# Patient Record
Sex: FEMALE | Race: WHITE | URBAN - METROPOLITAN AREA
[De-identification: names, ages, dates, MRNs, and addresses within clinical notes are randomized per-mention and may not be internally consistent; named-entity substitution may affect disease eponyms.]

---

## 2024-03-29 ENCOUNTER — HOSPITAL ENCOUNTER (OUTPATIENT)
Dept: RADIOLOGY | Facility: CLINIC | Age: 49
Discharge: HOME | End: 2024-03-29
Payer: COMMERCIAL

## 2024-03-29 VITALS — WEIGHT: 165 LBS | HEIGHT: 66 IN | BODY MASS INDEX: 26.52 KG/M2

## 2024-03-29 DIAGNOSIS — Z12.31 SCREENING MAMMOGRAM FOR BREAST CANCER: ICD-10-CM

## 2024-03-29 PROCEDURE — 77067 SCR MAMMO BI INCL CAD: CPT | Performed by: RADIOLOGY

## 2024-03-29 PROCEDURE — 77067 SCR MAMMO BI INCL CAD: CPT

## 2024-03-29 PROCEDURE — 77063 BREAST TOMOSYNTHESIS BI: CPT | Performed by: RADIOLOGY

## 2024-04-05 DIAGNOSIS — Z80.3 FAMILY HISTORY OF BREAST CANCER IN FEMALE: ICD-10-CM

## 2024-04-05 DIAGNOSIS — R92.333 HETEROGENEOUSLY DENSE TISSUE OF BOTH BREASTS ON MAMMOGRAPHY: ICD-10-CM

## 2024-04-05 DIAGNOSIS — Z91.89 AT HIGH RISK FOR BREAST CANCER: Primary | ICD-10-CM

## 2024-06-19 ENCOUNTER — OFFICE VISIT (OUTPATIENT)
Dept: OBSTETRICS AND GYNECOLOGY | Facility: CLINIC | Age: 49
End: 2024-06-19
Payer: COMMERCIAL

## 2024-06-19 VITALS
SYSTOLIC BLOOD PRESSURE: 124 MMHG | BODY MASS INDEX: 28.72 KG/M2 | WEIGHT: 183 LBS | HEIGHT: 67 IN | DIASTOLIC BLOOD PRESSURE: 82 MMHG

## 2024-06-19 DIAGNOSIS — Z11.51 ENCOUNTER FOR SCREENING FOR HUMAN PAPILLOMAVIRUS (HPV): ICD-10-CM

## 2024-06-19 DIAGNOSIS — Z01.419 ENCOUNTER FOR ANNUAL ROUTINE GYNECOLOGICAL EXAMINATION: Primary | ICD-10-CM

## 2024-06-19 DIAGNOSIS — Z12.11 COLON CANCER SCREENING: ICD-10-CM

## 2024-06-19 DIAGNOSIS — F32.89 OTHER DEPRESSION: ICD-10-CM

## 2024-06-19 PROCEDURE — 99396 PREV VISIT EST AGE 40-64: CPT | Performed by: OBSTETRICS & GYNECOLOGY

## 2024-06-19 RX ORDER — CITALOPRAM 10 MG/1
10 TABLET ORAL DAILY
COMMUNITY
End: 2024-06-20 | Stop reason: SDUPTHER

## 2024-06-19 ASSESSMENT — LIFESTYLE VARIABLES
HOW MANY STANDARD DRINKS CONTAINING ALCOHOL DO YOU HAVE ON A TYPICAL DAY: 1 OR 2
SKIP TO QUESTIONS 9-10: 1
HOW OFTEN DO YOU HAVE SIX OR MORE DRINKS ON ONE OCCASION: NEVER
AUDIT-C TOTAL SCORE: 2
HOW OFTEN DO YOU HAVE A DRINK CONTAINING ALCOHOL: 2-4 TIMES A MONTH

## 2024-06-19 ASSESSMENT — PATIENT HEALTH QUESTIONNAIRE - PHQ9
1. LITTLE INTEREST OR PLEASURE IN DOING THINGS: NOT AT ALL
SUM OF ALL RESPONSES TO PHQ9 QUESTIONS 1 & 2: 0
2. FEELING DOWN, DEPRESSED OR HOPELESS: NOT AT ALL

## 2024-06-19 ASSESSMENT — PAIN SCALES - GENERAL: PAINLEVEL: 0-NO PAIN

## 2024-06-19 ASSESSMENT — ENCOUNTER SYMPTOMS
VOMITING: 0
DYSURIA: 0
COUGH: 0
FATIGUE: 0
COLOR CHANGE: 0
DEPRESSION: 0
HEADACHES: 0
OCCASIONAL FEELINGS OF UNSTEADINESS: 0
FEVER: 0
DIZZINESS: 0
ABDOMINAL PAIN: 0
NAUSEA: 0
SHORTNESS OF BREATH: 0
CHILLS: 0
UNEXPECTED WEIGHT CHANGE: 0
LOSS OF SENSATION IN FEET: 0

## 2024-06-19 NOTE — PROGRESS NOTES
"Annual  Subjective   Myke Mendes is a 49 y.o. female who is here for a routine exam.     Complaints:   none  Periods: irregular   Dysmenorrhea:  none    Current contraception: condoms  History of abnormal Pap smear: no  History of abnormal mammogram: no      OB History          3    Para   2    Term   2       0    AB   1    Living   2         SAB   1    IAB   0    Ectopic   0    Multiple   0    Live Births   2                  Review of Systems   Constitutional:  Negative for chills, fatigue, fever and unexpected weight change.   Respiratory:  Negative for cough and shortness of breath.    Gastrointestinal:  Negative for abdominal pain, nausea and vomiting.   Genitourinary:  Positive for menstrual problem. Negative for dyspareunia, dysuria, pelvic pain and vaginal discharge.   Skin:  Negative for color change and rash.   Neurological:  Negative for dizziness and headaches.       Objective   /82   Ht 1.702 m (5' 7\")   Wt 83 kg (183 lb)   LMP 2024 (Exact Date)   BMI 28.66 kg/m²        General:   Alert and oriented, in no acute distress   Neck: Supple. No visible thyromegaly.    Breast/Axilla: Normal to palpation bilaterally without masses, skin changes, or nipple discharge.    Abdomen: Soft, non-tender, without masses or organomegaly   Vulva: Normal architecture without erythema, masses, or lesions.    Vagina: Normal mucosa without lesions, masses, or atrophy. No abnormal vaginal discharge.    Cervix: Normal without masses, lesions, or signs of cervicitis   Uterus: Normal, mobile, non-enlarged uterus   Adnexa: Normal without masses or lesions   Pelvic Floor normal   Psych Normal affect. Normal mood.      Assessment/Plan   Problem List Items Addressed This Visit    None  Visit Diagnoses         Codes    Encounter for annual routine gynecological examination    -  Primary Z01.419    Relevant Orders    THINPREP PAP TEST    Encounter for screening for human papillomavirus (HPV)     " Z11.51    Relevant Orders    THINPREP PAP TEST    Colon cancer screening     Z12.11    Relevant Orders    Referral to Gastroenterology    Other depression     F32.89    Relevant Medications    citalopram (CeleXA) 10 mg tablet            Routine annual    Pap due  today  Mammogram done, has MRI ordered    Wei Gaines MD

## 2024-06-20 RX ORDER — CITALOPRAM 10 MG/1
10 TABLET ORAL DAILY
Qty: 90 TABLET | Refills: 3 | Status: SHIPPED | OUTPATIENT
Start: 2024-06-20 | End: 2024-06-20 | Stop reason: WASHOUT

## 2024-06-28 LAB
CYTOLOGY CMNT CVX/VAG CYTO-IMP: NORMAL
HPV HR 12 DNA GENITAL QL NAA+PROBE: NEGATIVE
HPV HR GENOTYPES PNL CVX NAA+PROBE: NEGATIVE
HPV16 DNA SPEC QL NAA+PROBE: NEGATIVE
HPV18 DNA SPEC QL NAA+PROBE: NEGATIVE
LAB AP HPV GENOTYPE QUESTION: YES
LAB AP HPV HR: NORMAL
LABORATORY COMMENT REPORT: NORMAL
LABORATORY COMMENT REPORT: NORMAL
LMP START DATE: NORMAL
PATH REPORT.TOTAL CANCER: NORMAL

## 2024-10-11 ENCOUNTER — HOSPITAL ENCOUNTER (OUTPATIENT)
Dept: RADIOLOGY | Facility: HOSPITAL | Age: 49
Discharge: HOME | End: 2024-10-11
Payer: COMMERCIAL

## 2024-10-11 DIAGNOSIS — R92.333 HETEROGENEOUSLY DENSE TISSUE OF BOTH BREASTS ON MAMMOGRAPHY: ICD-10-CM

## 2024-10-11 DIAGNOSIS — Z91.89 AT HIGH RISK FOR BREAST CANCER: ICD-10-CM

## 2024-10-11 DIAGNOSIS — Z80.3 FAMILY HISTORY OF BREAST CANCER IN FEMALE: ICD-10-CM

## 2024-10-11 PROCEDURE — A9575 INJ GADOTERATE MEGLUMI 0.1ML: HCPCS

## 2024-10-11 PROCEDURE — 2550000001 HC RX 255 CONTRASTS

## 2024-10-11 PROCEDURE — 77049 MRI BREAST C-+ W/CAD BI: CPT

## 2024-10-11 RX ORDER — GADOTERATE MEGLUMINE 376.9 MG/ML
20 INJECTION INTRAVENOUS
Status: COMPLETED | OUTPATIENT
Start: 2024-10-11 | End: 2024-10-11

## 2024-10-24 DIAGNOSIS — R93.89 ABNORMAL MRI: Primary | ICD-10-CM

## 2024-12-09 ENCOUNTER — APPOINTMENT (OUTPATIENT)
Dept: GASTROENTEROLOGY | Facility: CLINIC | Age: 49
End: 2024-12-09
Payer: COMMERCIAL

## 2024-12-11 NOTE — PROGRESS NOTES
History Of Present Illness  Myke Mendes is a 49 y.o. female presenting to GI clinic to discuss colonoscopy. Patient has never had a previous colonoscopy, and she denies GI symptoms. No FH of any colorectal cancers or esophageal cancers.      She has a personal history of celiac disease and adheres to a gluten-free diet.  When she does accidentally consume gluten she gets bloated, has abdominal pain, diarrhea, and she becomes jackson.    Social History  She reports that she has never smoked. She has never used smokeless tobacco. She reports current alcohol use of about 3.0 - 4.0 standard drinks of alcohol per week. She reports that she does not use drugs.  She does not take NSAIDs on a regular basis  She is an eighth     Family History  Family History   Problem Relation Name Age of Onset    No Known Problems Mother      No Known Problems Father      Breast cancer Sister Allison Luna 47    Breast cancer Father's Sister Raquel dave 55    Breast cancer Paternal Grandmother Lety Cjea 75    Breast cancer Other Paternal great aunt 70     The patient does not have a FH of CRC. she does not have a FH of IBD    Review of Systems   Constitutional:  Negative for unexpected weight change.   Gastrointestinal: Negative.  Negative for abdominal distention, abdominal pain, anal bleeding, blood in stool, constipation, diarrhea, nausea, rectal pain and vomiting.        See HPI   All other systems reviewed and are negative.        Physical Exam  Constitutional:       General: She is not in acute distress.     Appearance: Normal appearance. She is overweight. She is not ill-appearing.   HENT:      Head: Normocephalic and atraumatic.      Mouth/Throat:      Mouth: Mucous membranes are moist.   Eyes:      General: No scleral icterus.     Conjunctiva/sclera: Conjunctivae normal.      Pupils: Pupils are equal, round, and reactive to light.   Pulmonary:      Effort: Pulmonary effort is normal.   Abdominal:       "General: Bowel sounds are normal. There is no distension.      Palpations: Abdomen is soft. There is no mass.      Tenderness: There is no abdominal tenderness.      Hernia: No hernia is present.   Musculoskeletal:         General: Normal range of motion.      Cervical back: Normal range of motion.   Skin:     General: Skin is warm and dry.      Coloration: Skin is not jaundiced or pale.   Neurological:      Mental Status: She is alert. Mental status is at baseline.   Psychiatric:         Mood and Affect: Mood normal.         Behavior: Behavior normal.          Last Vital Signs  /82   Pulse 66   Temp 36.5 °C (97.7 °F)   Ht 1.676 m (5' 6\")   Wt 84.8 kg (187 lb)   SpO2 100%   BMI 30.18 kg/m²      Relevant Results  No results found for: \"WBC\", \"HGB\", \"HCT\", \"MCV\", \"PLT\" No results found for: \"GLUCOSE\", \"CALCIUM\", \"NA\", \"K\", \"CO2\", \"CL\", \"BUN\", \"CREATININE\" No results found for: \"ALT\", \"AST\", \"GGT\", \"ALKPHOS\", \"BILITOT\", \"BILIDIR\", \"BILIIND\", \"AFPTM\", \"INR\" No results found for: \"IRON\", \"TIBC\", \"IRONSAT\", \"FERRITIN\", \"AXFQTQLO48\", \"FOLATE\"  No results found for: \"CALPS\", \"CRP\" No results found for: \"LIPASE\" No results found for: \"HGBA1C\"   Component 03/20/17 04/24/14   IgA 193 --   Transglutaminase Ab, IgA 38 High   29 High     Transglutaminase Ab, IgG Test Not Indicated --   Endomysial Ab, IgA Reference range: DLT10  --   Gliadin Deamidated IgA Ab 9  --   Gliadin Deamidated IgG Ab 33 High   --   Interpretation (Celiac Screen) The presence of TTG IgA antibodies, coupled with additional findings in a   confirmatory assay, indicates an increased likelihood of celiac disease. --     EGD/COLONOSCOPY/COLOGUARD  EGD 3/3/2017 with Dr. Munoz Banner-   Findings:  ESOPHAGUS: The esophageal mucosa appeared normal without evidence of Collins's or reflux esophagitis  STOMACH: There was mild antral gastritis, with linear erythema extending to the pylorus. Multiple biopsies were obtained from the antrum and the body of the " stomach to evaluate for H. pylori infection.   PYLORUS: The pylorus appeared normal.   DUODENUM: The mucosa in the duodenal bulb was edematous and erythematous. Cold biopsies were obtained to rule out celiac disease. The mucosa within the second portion of the duodenum appeared normal.   FINAL DIAGNOSIS  A. Duodenum, Biopsy:  -  MALABSORPTION PATTERN WITH PARTIAL VILLOUS ATROPHY.  Comment:  There is partial villous atrophy, crypt hyperplasia, and chronic  inflammation.  These changes are nonspecific, but may reflect conditions such as  early or partially treated celiac sprue, bacterial overgrowth, hypersensitivity  reaction to nongluten proteins, tropical sprue, or protein calorie malnutrition.    B. Stomach, Biopsy:  - FOCAL CHRONIC INACTIVE GASTRITIS.  Comment: Giemsa stain is negative for Helicobacter pylori. No intestinal  metaplasia is seen.  There is no evidence of dysplasia.      Colonoscopy-   never       Assessment/Plan    Assessment & Plan  Colon cancer screening  Screening colonoscopy ordered  Orders:    Referral to Gastroenterology    Colonoscopy Screening; Average Risk Patient; Future    Celiac disease ([facility])  Celiac panel, CRP ordered  May need additional labs or EGD pending results  Orders:    Celiac Panel; Future    C-Reactive Protein; Future    Follow-up as needed      ART Nolan-CNP  12/16/24

## 2024-12-16 ENCOUNTER — APPOINTMENT (OUTPATIENT)
Dept: GASTROENTEROLOGY | Facility: CLINIC | Age: 49
End: 2024-12-16
Payer: COMMERCIAL

## 2024-12-16 ENCOUNTER — LAB (OUTPATIENT)
Dept: LAB | Facility: LAB | Age: 49
End: 2024-12-16
Payer: COMMERCIAL

## 2024-12-16 VITALS
HEART RATE: 66 BPM | OXYGEN SATURATION: 100 % | TEMPERATURE: 97.7 F | WEIGHT: 187 LBS | BODY MASS INDEX: 30.05 KG/M2 | HEIGHT: 66 IN | SYSTOLIC BLOOD PRESSURE: 160 MMHG | DIASTOLIC BLOOD PRESSURE: 82 MMHG

## 2024-12-16 DIAGNOSIS — Z12.11 COLON CANCER SCREENING: ICD-10-CM

## 2024-12-16 DIAGNOSIS — K90.0 CELIAC DISEASE (HHS-HCC): Primary | ICD-10-CM

## 2024-12-16 DIAGNOSIS — K90.0 CELIAC DISEASE (HHS-HCC): ICD-10-CM

## 2024-12-16 PROBLEM — F41.9 ANXIETY: Status: ACTIVE | Noted: 2024-12-16

## 2024-12-16 PROBLEM — N95.1 PERIMENOPAUSE: Status: ACTIVE | Noted: 2024-12-16

## 2024-12-16 LAB
CRP SERPL-MCNC: <0.1 MG/DL
GLIADIN PEPTIDE IGA SER IA-ACNC: 1.1 U/ML
TTG IGA SER IA-ACNC: 1.3 U/ML

## 2024-12-16 PROCEDURE — 83516 IMMUNOASSAY NONANTIBODY: CPT

## 2024-12-16 PROCEDURE — 3008F BODY MASS INDEX DOCD: CPT

## 2024-12-16 PROCEDURE — 36415 COLL VENOUS BLD VENIPUNCTURE: CPT

## 2024-12-16 PROCEDURE — 1036F TOBACCO NON-USER: CPT

## 2024-12-16 PROCEDURE — 99203 OFFICE O/P NEW LOW 30 MIN: CPT

## 2024-12-16 ASSESSMENT — ENCOUNTER SYMPTOMS
RECTAL PAIN: 0
DIARRHEA: 0
ANAL BLEEDING: 0
UNEXPECTED WEIGHT CHANGE: 0
ROS GI COMMENTS: SEE HPI
BLOOD IN STOOL: 0
ABDOMINAL PAIN: 0
VOMITING: 0
CONSTIPATION: 0
GASTROINTESTINAL NEGATIVE: 1
NAUSEA: 0
ABDOMINAL DISTENTION: 0

## 2024-12-18 LAB
GLIADIN PEPTIDE IGG SER IA-ACNC: <0.56 FLU (ref 0–4.99)
TTG IGG SER IA-ACNC: <0.82 FLU (ref 0–4.99)

## 2024-12-18 NOTE — RESULT ENCOUNTER NOTE
Please call Myke and let her know that lab results were unremarkable and her celiac disease is well-controlled.

## 2025-01-06 ENCOUNTER — ANESTHESIA EVENT (OUTPATIENT)
Dept: GASTROENTEROLOGY | Facility: HOSPITAL | Age: 50
End: 2025-01-06
Payer: COMMERCIAL

## 2025-01-06 PROBLEM — K90.0 CELIAC DISEASE (HHS-HCC): Status: ACTIVE | Noted: 2025-01-06

## 2025-01-06 SDOH — HEALTH STABILITY: MENTAL HEALTH: CURRENT SMOKER: 0

## 2025-01-06 NOTE — ANESTHESIA PREPROCEDURE EVALUATION
Patient: Myke Mendes    Procedure Information       Date/Time: 01/23/25 0800    Scheduled providers: Kristin Anaya MD    Procedure: COLONOSCOPY    Location: [facility]            Relevant Problems   Anesthesia (within normal limits)      Cardiac (within normal limits)      Pulmonary (within normal limits)      Neuro   (+) Anxiety      GI (within normal limits)      /Renal (within normal limits)      Liver (within normal limits)      Endocrine (within normal limits)      Hematology (within normal limits)      Musculoskeletal (within normal limits)      HEENT (within normal limits)      ID (within normal limits)      Skin (within normal limits)      GYN (within normal limits)     There were no vitals filed for this visit.    Past Surgical History:   Procedure Laterality Date    BI STEREOTACTIC GUIDED BREAST LEFT LOCALIZATION AND BIOPSY Left 08/05/2015    BI STEREOTACTIC GUIDED BREAST LOCALIZATION AND BIOPSY LEFT [facility]    BREAST BIOPSY       Past Medical History:   Diagnosis Date    Depression     Rh incompatibility        Current Outpatient Medications:     cetirizine (ZyrTEC) 10 mg capsule, Take 1 capsule (10 mg) by mouth 1 time., Disp: , Rfl:     citalopram (CeleXA) 20 mg tablet, Take 1 tablet (20 mg) by mouth once daily., Disp: 90 tablet, Rfl: 3  Prior to Admission medications    Medication Sig Start Date End Date Taking? Authorizing Provider   cetirizine (ZyrTEC) 10 mg capsule Take 1 capsule (10 mg) by mouth 1 time.    Historical Provider, MD   citalopram (CeleXA) 20 mg tablet Take 1 tablet (20 mg) by mouth once daily. 6/20/24   Wei Gaines MD     Allergies   Allergen Reactions    Penicillins Hives, Shortness of breath, Swelling and Unknown     Throat swells     Social History     Tobacco Use    Smoking status: Never    Smokeless tobacco: Never   Substance Use Topics    Alcohol use: Yes     Alcohol/week: 3.0 - 4.0 standard drinks of alcohol     Types: 3 - 4 Glasses of wine  "per week         Chemistry    No results found for: \"NA\", \"K\", \"CL\", \"CO2\", \"BUN\", \"CREATININE\", \"GLU\" No results found for: \"CALCIUM\", \"ALKPHOS\", \"AST\", \"ALT\", \"BILITOT\"       No results found for: \"WBC\", \"HGB\", \"HCT\", \"PLT\"  No results found for: \"PROTIME\", \"PTT\", \"INR\"  No results found for this or any previous visit (from the past 4464 hours).  No results found for this or any previous visit from the past 1095 days.      Clinical information reviewed:                 Chart reviewed.  No clearances ordered.    NPO Detail:  No data recorded     Physical Exam    Airway  Mallampati: II     Cardiovascular - normal exam     Dental    Pulmonary - normal exam     Abdominal - normal exam             Anesthesia Plan    History of general anesthesia?: yes  History of complications of general anesthesia?: no    ASA 2     MAC     The patient is not a current smoker.  Patient did not smoke on day of procedure.    intravenous induction   Anesthetic plan and risks discussed with patient.      "

## 2025-01-08 ENCOUNTER — PRE-ADMISSION TESTING (OUTPATIENT)
Dept: PREADMISSION TESTING | Facility: HOSPITAL | Age: 50
End: 2025-01-08
Payer: COMMERCIAL

## 2025-01-08 VITALS — WEIGHT: 187 LBS | HEIGHT: 66 IN | BODY MASS INDEX: 30.05 KG/M2

## 2025-01-08 ASSESSMENT — DUKE ACTIVITY SCORE INDEX (DASI)
CAN YOU WALK A BLOCK OR TWO ON LEVEL GROUND: YES
CAN YOU RUN A SHORT DISTANCE: YES
CAN YOU DO MODERATE WORK AROUND THE HOUSE LIKE VACUUMING, SWEEPING FLOORS OR CARRYING GROCERIES: YES
CAN YOU DO YARD WORK LIKE RAKING LEAVES, WEEDING OR PUSHING A MOWER: YES
TOTAL_SCORE: 58.2
CAN YOU DO LIGHT WORK AROUND THE HOUSE LIKE DUSTING OR WASHING DISHES: YES
DASI METS SCORE: 9.9
CAN YOU TAKE CARE OF YOURSELF (EAT, DRESS, BATHE, OR USE TOILET): YES
CAN YOU CLIMB A FLIGHT OF STAIRS OR WALK UP A HILL: YES
CAN YOU WALK INDOORS, SUCH AS AROUND YOUR HOUSE: YES
CAN YOU PARTICIPATE IN MODERATE RECREATIONAL ACTIVITIES LIKE GOLF, BOWLING, DANCING, DOUBLES TENNIS OR THROWING A BASEBALL OR FOOTBALL: YES
CAN YOU HAVE SEXUAL RELATIONS: YES
CAN YOU DO HEAVY WORK AROUND THE HOUSE LIKE SCRUBBING FLOORS OR LIFTING AND MOVING HEAVY FURNITURE: YES
CAN YOU PARTICIPATE IN STRENOUS SPORTS LIKE SWIMMING, SINGLES TENNIS, FOOTBALL, BASKETBALL, OR SKIING: YES

## 2025-01-08 NOTE — PREPROCEDURE INSTRUCTIONS
Medication List            Accurate as of January 8, 2025 12:12 PM. Always use your most recent med list.                citalopram 20 mg tablet  Commonly known as: CeleXA  Take 1 tablet (20 mg) by mouth once daily.  Medication Adjustments for Surgery: Take/Use as prescribed     ZyrTEC 10 mg capsule  Generic drug: cetirizine  Medication Adjustments for Surgery: Take/Use as prescribed                   Follow prep instructions carefully.   Day before procedure-Drink Only CLEAR LIQUIDS -No dairy products, nothing red/purple.  Take first part of prep- Evening Before Procedure, (5pm)  Drink lots of liquids afterwards.   Day of Procedure- Take Second Part of Prep 5 hours before arrival time.    After Midnight the only Clear Liquid allowed is: Water, Black Coffee, Tea, Gatorade and Clear Soda. STOP DRINKING 3 HOURS PRIOR TO PROCEDURE.   No gum, candy, mints or smoking.   Take medications as instructed.       Second floor-Outpt dept to check in.  Bring Photo ID and Insurance card,   You MUST have a  with you.  No more than 2 visitors with you please.   [phone]    Colonoscopy Preparation Instructions      Prior to your procedure, purchase the following from your local grocery store or pharmacy. A prescription is not needed.    1) Miralax (Glycolax) 8.3 oz. aka Polyethylene Glycol Powder    (238 gram bottle)    2) Bisacodyl (Dulcolax laxative) 5 mg    Tablets, NOT suppositories    3) Quantity 2 - 32 ounce bottles of Gatorade, Powerade or any non-carbonated clear liquid. (Non-carbonated for miralax mixture only. Pt may have carbonated drinks before or after drinking miralax prep solution).        Medications that MUST BE STOPPED prior to procedure:  Must be stopped three (3) days prior to procedure:  Supplements    Fiber Iron    All Vitamins

## 2025-01-23 ENCOUNTER — HOSPITAL ENCOUNTER (OUTPATIENT)
Dept: GASTROENTEROLOGY | Facility: HOSPITAL | Age: 50
Discharge: HOME | End: 2025-01-23
Payer: COMMERCIAL

## 2025-01-23 ENCOUNTER — ANESTHESIA (OUTPATIENT)
Dept: GASTROENTEROLOGY | Facility: HOSPITAL | Age: 50
End: 2025-01-23
Payer: COMMERCIAL

## 2025-01-23 VITALS
BODY MASS INDEX: 30.05 KG/M2 | SYSTOLIC BLOOD PRESSURE: 125 MMHG | HEIGHT: 66 IN | TEMPERATURE: 98.1 F | HEART RATE: 63 BPM | WEIGHT: 187 LBS | DIASTOLIC BLOOD PRESSURE: 63 MMHG | RESPIRATION RATE: 18 BRPM | OXYGEN SATURATION: 100 %

## 2025-01-23 DIAGNOSIS — Z12.11 COLON CANCER SCREENING: ICD-10-CM

## 2025-01-23 LAB — HCG UR QL IA.RAPID: NEGATIVE

## 2025-01-23 PROCEDURE — 81025 URINE PREGNANCY TEST: CPT

## 2025-01-23 PROCEDURE — 45385 COLONOSCOPY W/LESION REMOVAL: CPT | Performed by: SURGERY

## 2025-01-23 PROCEDURE — 2720000007 HC OR 272 NO HCPCS

## 2025-01-23 PROCEDURE — 2500000004 HC RX 250 GENERAL PHARMACY W/ HCPCS (ALT 636 FOR OP/ED): Performed by: NURSE ANESTHETIST, CERTIFIED REGISTERED

## 2025-01-23 PROCEDURE — 7100000009 HC PHASE TWO TIME - INITIAL BASE CHARGE

## 2025-01-23 PROCEDURE — 7100000010 HC PHASE TWO TIME - EACH INCREMENTAL 1 MINUTE

## 2025-01-23 PROCEDURE — 3700000002 HC GENERAL ANESTHESIA TIME - EACH INCREMENTAL 1 MINUTE

## 2025-01-23 PROCEDURE — 3700000001 HC GENERAL ANESTHESIA TIME - INITIAL BASE CHARGE

## 2025-01-23 RX ORDER — LIDOCAINE HYDROCHLORIDE 20 MG/ML
INJECTION, SOLUTION EPIDURAL; INFILTRATION; INTRACAUDAL; PERINEURAL AS NEEDED
Status: DISCONTINUED | OUTPATIENT
Start: 2025-01-23 | End: 2025-01-23

## 2025-01-23 RX ORDER — PROPOFOL 10 MG/ML
INJECTION, EMULSION INTRAVENOUS AS NEEDED
Status: DISCONTINUED | OUTPATIENT
Start: 2025-01-23 | End: 2025-01-23

## 2025-01-23 RX ADMIN — LIDOCAINE HYDROCHLORIDE 40 MG: 20 INJECTION, SOLUTION EPIDURAL; INFILTRATION; INTRACAUDAL; PERINEURAL at 07:59

## 2025-01-23 RX ADMIN — PROPOFOL 50 MG: 10 INJECTION, EMULSION INTRAVENOUS at 08:11

## 2025-01-23 RX ADMIN — PROPOFOL 50 MG: 10 INJECTION, EMULSION INTRAVENOUS at 08:07

## 2025-01-23 RX ADMIN — PROPOFOL 120 MG: 10 INJECTION, EMULSION INTRAVENOUS at 07:59

## 2025-01-23 RX ADMIN — PROPOFOL 50 MG: 10 INJECTION, EMULSION INTRAVENOUS at 08:03

## 2025-01-23 RX ADMIN — PROPOFOL 50 MG: 10 INJECTION, EMULSION INTRAVENOUS at 08:14

## 2025-01-23 ASSESSMENT — ENCOUNTER SYMPTOMS
VOMITING: 0
DIARRHEA: 0
CHILLS: 0
MUSCULOSKELETAL NEGATIVE: 1
ABDOMINAL PAIN: 0
CARDIOVASCULAR NEGATIVE: 1
PSYCHIATRIC NEGATIVE: 1
FEVER: 0
BLOOD IN STOOL: 0
WOUND: 0
SHORTNESS OF BREATH: 0
NAUSEA: 0
NEUROLOGICAL NEGATIVE: 1
CONSTIPATION: 0
FATIGUE: 0
DIAPHORESIS: 0

## 2025-01-23 ASSESSMENT — COLUMBIA-SUICIDE SEVERITY RATING SCALE - C-SSRS
2. HAVE YOU ACTUALLY HAD ANY THOUGHTS OF KILLING YOURSELF?: NO
6. HAVE YOU EVER DONE ANYTHING, STARTED TO DO ANYTHING, OR PREPARED TO DO ANYTHING TO END YOUR LIFE?: NO
1. IN THE PAST MONTH, HAVE YOU WISHED YOU WERE DEAD OR WISHED YOU COULD GO TO SLEEP AND NOT WAKE UP?: NO

## 2025-01-23 ASSESSMENT — PAIN SCALES - GENERAL
PAINLEVEL_OUTOF10: 0 - NO PAIN
PAIN_LEVEL: 0
PAINLEVEL_OUTOF10: 0 - NO PAIN
PAINLEVEL_OUTOF10: 0 - NO PAIN

## 2025-01-23 ASSESSMENT — PAIN - FUNCTIONAL ASSESSMENT
PAIN_FUNCTIONAL_ASSESSMENT: 0-10

## 2025-01-23 NOTE — DISCHARGE INSTRUCTIONS
Patient Instructions after a Colonoscopy      The anesthetics, sedatives or narcotics which were given to you today will be acting in your body for the next 24 hours, so you might feel a little sleepy or groggy.  This feeling should slowly wear off. Carefully read and follow the instructions.     You received sedation today:  - Do not drive or operate any machinery or power tools of any kind.   - No alcoholic beverages today, not even beer or wine.  - Do not make any important decisions or sign any legal documents.  - No over the counter medications that contain alcohol or that may cause drowsiness.  - Do not make any important decisions or sign any legal documents.  - Make sure you have someone with you for first 24 hours.    While it is common to experience mild to moderate abdominal distention, gas, or belching after your procedure, if any of these symptoms occur following discharge from the GI Lab or within one week of having your procedure, call the [facility] to be advised whether a visit to your nearest Urgent Care or Emergency Department is indicated.  Take this paper with you if you go.     - If you develop an allergic reaction to the medications that were given during your procedure such as difficulty breathing, rash, hives, severe nausea, vomiting or lightheadedness.  - If you experience chest pain, shortness of breath, severe abdominal pain, fevers and chills.  -If you develop signs and symptoms of bleeding such as blood in your spit, if your stools turn black, tarry, or bloody  - If you have not urinated within 8 hours following your procedure.  - If your IV site becomes painful, red, inflamed, or looks infected.    If you received a biopsy/polypectomy/sphincterotomy the following instructions apply below:    __ Do not use Aspirin containing products, non-steroidal medications or anti-coagulants for one week following your procedure. (Examples of these types of medications are: Advil,  Arthrotec, Aleve, Coumadin, Ecotrin, Heparin, Ibuprofen, Indocin, Motrin, Naprosyn, Nuprin, Plavix, Vioxx, and Voltarin, or their generic forms.  This list is not all-inclusive.  Check with your physician or pharmacist before resuming medications.)   __ Eat a soft diet today.  Avoid foods that are poorly digested for the next 24 hours.  These foods would include: nuts, beans, lettuce, red meats, and fried foods. Start with liquids and advance your diet as tolerated, gradually work up to eating solids.   __ Do not have a Barium Study or Enema for one week.    Your physician recommends the additional following instructions:    -You have a contact number available for emergencies. The signs and symptoms of potential delayed complications were discussed with you. You may return to normal activities tomorrow.  -Resume your previous diet.  -Continue your present medications.   -We are waiting for your pathology results.  -Your physician has recommended a repeat colonoscopy (date to be determined after pending pathology results are reviewed) for surveillance based on pathology results.  -The findings and recommendations have been discussed with you.  -The findings and recommendations were discussed with your family.  - Please see Medication Reconciliation Form for new medication/medications prescribed.       If you experience any problems or have any questions following discharge from the GI Lab, please call:        Nurse Signature                                                                        Date___________________                                                                            Patient/Responsible Party Signature                                        Date___________________

## 2025-01-23 NOTE — ANESTHESIA POSTPROCEDURE EVALUATION
Patient: Myke Mendes    Procedure Summary       Date: 01/23/25 Room / Location: [facility]    Anesthesia Start: 0756 Anesthesia Stop: 0822    Procedure: COLONOSCOPY Diagnosis: Colon cancer screening    Scheduled Providers: Kristin Anaya MD Responsible Provider: ZION Rico    Anesthesia Type: MAC ASA Status: 2            Anesthesia Type: MAC    Vitals Value Taken Time   BP 88/51 01/23/25 0820   Temp 36.7 °C (98.1 °F) 01/23/25 0820   Pulse 73 01/23/25 0820   Resp 16 01/23/25 0820   SpO2 95 % 01/23/25 0820       Anesthesia Post Evaluation    Patient location during evaluation: PACU  Patient participation: complete - patient participated  Level of consciousness: awake and alert  Pain score: 0  Pain management: adequate  Airway patency: patent  Cardiovascular status: acceptable  Respiratory status: acceptable  Hydration status: acceptable  Postoperative Nausea and Vomiting: none        There were no known notable events for this encounter.

## 2025-01-23 NOTE — H&P
History Of Present Illness  Myke Mendes is a 49 y.o. female presenting with colon cancer screening. Here for colonoscopy. First colonoscopy today. Positive hx of celiac disease. Patient states she watches what she eats and it is well controlled. Reports no blood in stool. Regular bowel movements. Negative surgical hx. Denies family history of colon cancer, Crohn's, and UC. Tolerated prep well. Denies fever, chills, SoB, CP, n/v/d.      Past Medical History  Past Medical History:   Diagnosis Date    Depression     Rh incompatibility        Surgical History  Past Surgical History:   Procedure Laterality Date    BI STEREOTACTIC GUIDED BREAST LEFT LOCALIZATION AND BIOPSY Left 08/05/2015    BI STEREOTACTIC GUIDED BREAST LOCALIZATION AND BIOPSY LEFT [facility]    BREAST BIOPSY          Social History  She reports that she has never smoked. She has never used smokeless tobacco. She reports current alcohol use of about 3.0 - 4.0 standard drinks of alcohol per week. She reports that she does not use drugs.    Family History  Family History   Problem Relation Name Age of Onset    No Known Problems Mother      No Known Problems Father      Breast cancer Sister Allison Luna 47    Breast cancer Father's Sister Raquel dave 55    Breast cancer Paternal Grandmother Lety Ceja 75    Breast cancer Other Paternal great aunt 70        Allergies  Penicillins    Current Outpatient Medications on File Prior to Encounter   Medication Sig Dispense Refill    cetirizine (ZyrTEC) 10 mg capsule Take 1 capsule (10 mg) by mouth 1 time.      citalopram (CeleXA) 20 mg tablet Take 1 tablet (20 mg) by mouth once daily. 90 tablet 3     No current facility-administered medications on file prior to encounter.      Review of Systems   Constitutional:  Negative for chills, diaphoresis, fatigue and fever.   HENT: Negative.     Respiratory:  Negative for shortness of breath.    Cardiovascular: Negative.  Negative for chest pain.    Gastrointestinal:  Negative for abdominal pain, blood in stool, constipation, diarrhea, nausea and vomiting.   Musculoskeletal: Negative.    Skin:  Negative for pallor, rash and wound.   Neurological: Negative.    Psychiatric/Behavioral: Negative.          Physical Exam  Constitutional:       General: She is not in acute distress.     Appearance: Normal appearance. She is not ill-appearing.   HENT:      Head: Normocephalic.   Eyes:      General: No scleral icterus.     Conjunctiva/sclera: Conjunctivae normal.      Pupils: Pupils are equal, round, and reactive to light.   Cardiovascular:      Rate and Rhythm: Normal rate and regular rhythm.      Pulses: Normal pulses.      Heart sounds: Normal heart sounds. No murmur heard.  Pulmonary:      Effort: Pulmonary effort is normal. No respiratory distress.      Breath sounds: Normal breath sounds.   Abdominal:      General: Abdomen is flat. Bowel sounds are normal. There is no distension.      Palpations: Abdomen is soft.      Tenderness: There is no abdominal tenderness.   Musculoskeletal:      Cervical back: Normal range of motion and neck supple.   Skin:     General: Skin is warm and dry.   Neurological:      General: No focal deficit present.      Mental Status: She is alert and oriented to person, place, and time.   Psychiatric:         Mood and Affect: Mood normal.         Behavior: Behavior normal.          Last Recorded Vitals  Vitals:    01/23/25 0716   BP: 126/77   Pulse: 85   Resp: 17   Temp: 36.4 °C (97.6 °F)   SpO2: 100%          Assessment/Plan   Assessment & Plan  Colon cancer screening      Colonoscopy.       I spent 20 minutes in the professional and overall care of this patient.      Felipe Hinson PA-C

## 2025-01-29 LAB
LABORATORY COMMENT REPORT: NORMAL
PATH REPORT.FINAL DX SPEC: NORMAL
PATH REPORT.GROSS SPEC: NORMAL
PATH REPORT.RELEVANT HX SPEC: NORMAL
PATH REPORT.TOTAL CANCER: NORMAL

## 2025-04-24 ENCOUNTER — APPOINTMENT (OUTPATIENT)
Dept: RADIOLOGY | Facility: HOSPITAL | Age: 50
End: 2025-04-24
Payer: COMMERCIAL

## 2025-04-24 DIAGNOSIS — Z12.31 SCREENING MAMMOGRAM FOR BREAST CANCER: ICD-10-CM

## 2025-04-25 ENCOUNTER — APPOINTMENT (OUTPATIENT)
Dept: RADIOLOGY | Facility: HOSPITAL | Age: 50
End: 2025-04-25
Payer: COMMERCIAL

## 2025-05-07 ENCOUNTER — HOSPITAL ENCOUNTER (OUTPATIENT)
Dept: RADIOLOGY | Facility: HOSPITAL | Age: 50
Discharge: HOME | End: 2025-05-07
Payer: COMMERCIAL

## 2025-05-07 DIAGNOSIS — R93.89 ABNORMAL MRI: ICD-10-CM

## 2025-05-07 PROCEDURE — 77049 MRI BREAST C-+ W/CAD BI: CPT

## 2025-05-07 PROCEDURE — A9575 INJ GADOTERATE MEGLUMI 0.1ML: HCPCS | Mod: JW | Performed by: OBSTETRICS & GYNECOLOGY

## 2025-05-07 PROCEDURE — 2550000001 HC RX 255 CONTRASTS: Mod: JW | Performed by: OBSTETRICS & GYNECOLOGY

## 2025-05-07 RX ORDER — GADOTERATE MEGLUMINE 376.9 MG/ML
16 INJECTION INTRAVENOUS
Status: COMPLETED | OUTPATIENT
Start: 2025-05-07 | End: 2025-05-07

## 2025-05-07 RX ADMIN — GADOTERATE MEGLUMINE 16 ML: 376.9 INJECTION INTRAVENOUS at 10:24

## 2025-05-13 DIAGNOSIS — R92.8 ABNORMAL MRI, BREAST: Primary | ICD-10-CM

## 2025-05-13 DIAGNOSIS — Z91.89 INCREASED RISK OF BREAST CANCER: ICD-10-CM

## 2025-05-13 DIAGNOSIS — Z80.3 FAMILY HISTORY OF BREAST CANCER: ICD-10-CM

## 2025-06-09 ENCOUNTER — APPOINTMENT (OUTPATIENT)
Dept: RADIOLOGY | Facility: CLINIC | Age: 50
End: 2025-06-09
Payer: COMMERCIAL

## 2025-06-09 VITALS — WEIGHT: 187 LBS | HEIGHT: 66 IN | BODY MASS INDEX: 30.05 KG/M2

## 2025-06-09 DIAGNOSIS — Z12.31 SCREENING MAMMOGRAM FOR BREAST CANCER: ICD-10-CM

## 2025-06-09 PROCEDURE — 77063 BREAST TOMOSYNTHESIS BI: CPT | Performed by: RADIOLOGY

## 2025-06-09 PROCEDURE — 77067 SCR MAMMO BI INCL CAD: CPT | Performed by: RADIOLOGY

## 2025-06-09 PROCEDURE — 77067 SCR MAMMO BI INCL CAD: CPT

## 2025-09-29 ENCOUNTER — APPOINTMENT (OUTPATIENT)
Dept: OBSTETRICS AND GYNECOLOGY | Facility: CLINIC | Age: 50
End: 2025-09-29
Payer: COMMERCIAL